# Patient Record
Sex: FEMALE | Race: WHITE
[De-identification: names, ages, dates, MRNs, and addresses within clinical notes are randomized per-mention and may not be internally consistent; named-entity substitution may affect disease eponyms.]

---

## 2017-04-27 ENCOUNTER — HOSPITAL ENCOUNTER (EMERGENCY)
Dept: HOSPITAL 47 - EC | Age: 37
Discharge: HOME | End: 2017-04-27
Payer: COMMERCIAL

## 2017-04-27 VITALS — DIASTOLIC BLOOD PRESSURE: 70 MMHG | RESPIRATION RATE: 16 BRPM | SYSTOLIC BLOOD PRESSURE: 123 MMHG | HEART RATE: 65 BPM

## 2017-04-27 VITALS — TEMPERATURE: 97.3 F

## 2017-04-27 DIAGNOSIS — Z3A.13: ICD-10-CM

## 2017-04-27 DIAGNOSIS — Z91.048: ICD-10-CM

## 2017-04-27 DIAGNOSIS — O99.89: ICD-10-CM

## 2017-04-27 DIAGNOSIS — O21.9: ICD-10-CM

## 2017-04-27 DIAGNOSIS — O23.41: Primary | ICD-10-CM

## 2017-04-27 DIAGNOSIS — Z79.899: ICD-10-CM

## 2017-04-27 DIAGNOSIS — R10.30: ICD-10-CM

## 2017-04-27 LAB
ANION GAP SERPL CALC-SCNC: 6 MMOL/L
BASOPHILS # BLD AUTO: 0 K/UL (ref 0–0.2)
BASOPHILS NFR BLD AUTO: 0 %
BUN SERPL-SCNC: 7 MG/DL (ref 7–17)
CALCIUM SPEC-MCNC: 9.1 MG/DL (ref 8.4–10.2)
CH: 30.7
CHCM: 33.9
CHLORIDE SERPL-SCNC: 105 MMOL/L (ref 98–107)
CO2 SERPL-SCNC: 24 MMOL/L (ref 22–30)
EOSINOPHIL # BLD AUTO: 0.4 K/UL (ref 0–0.7)
EOSINOPHIL NFR BLD AUTO: 3 %
ERYTHROCYTE [DISTWIDTH] IN BLOOD BY AUTOMATED COUNT: 4.13 M/UL (ref 3.8–5.4)
ERYTHROCYTE [DISTWIDTH] IN BLOOD: 12.9 % (ref 11.5–15.5)
GLUCOSE SERPL-MCNC: 88 MG/DL (ref 74–99)
HCG SERPL-MCNC: (no result) MIU/ML
HCT VFR BLD AUTO: 37.5 % (ref 34–46)
HDW: 2.38
HGB BLD-MCNC: 12.7 GM/DL (ref 11.4–16)
LUC NFR BLD AUTO: 1 %
LYMPHOCYTES # SPEC AUTO: 2 K/UL (ref 1–4.8)
LYMPHOCYTES NFR SPEC AUTO: 16 %
MCH RBC QN AUTO: 30.7 PG (ref 25–35)
MCHC RBC AUTO-ENTMCNC: 33.8 G/DL (ref 31–37)
MCV RBC AUTO: 90.8 FL (ref 80–100)
MONOCYTES # BLD AUTO: 0.5 K/UL (ref 0–1)
MONOCYTES NFR BLD AUTO: 4 %
NEUTROPHILS # BLD AUTO: 9.9 K/UL (ref 1.3–7.7)
NEUTROPHILS NFR BLD AUTO: 76 %
NON-AFRICAN AMERICAN GFR(MDRD): >60
PARTICLE COUNT: 8251
PH UR: 5.5 [PH] (ref 5–8)
POTASSIUM SERPL-SCNC: 4 MMOL/L (ref 3.5–5.1)
RBC UR QL: 2 /HPF (ref 0–5)
SODIUM SERPL-SCNC: 135 MMOL/L (ref 137–145)
SP GR UR: 1.02 (ref 1–1.03)
SQUAMOUS UR QL AUTO: 14 /HPF (ref 0–4)
UA BILLING (MACRO VS. MICRO): (no result)
UROBILINOGEN UR QL STRIP: <2 MG/DL (ref ?–2)
WBC # BLD AUTO: 0.13 10*3/UL
WBC # BLD AUTO: 13 K/UL (ref 3.8–10.6)
WBC #/AREA URNS HPF: 32 /HPF (ref 0–5)
WBC (PEROX): 12.8

## 2017-04-27 PROCEDURE — 87591 N.GONORRHOEAE DNA AMP PROB: CPT

## 2017-04-27 PROCEDURE — 87808 TRICHOMONAS ASSAY W/OPTIC: CPT

## 2017-04-27 PROCEDURE — 36415 COLL VENOUS BLD VENIPUNCTURE: CPT

## 2017-04-27 PROCEDURE — 76802 OB US < 14 WKS ADDL FETUS: CPT

## 2017-04-27 PROCEDURE — 87086 URINE CULTURE/COLONY COUNT: CPT

## 2017-04-27 PROCEDURE — 99284 EMERGENCY DEPT VISIT MOD MDM: CPT

## 2017-04-27 PROCEDURE — 87205 SMEAR GRAM STAIN: CPT

## 2017-04-27 PROCEDURE — 87491 CHLMYD TRACH DNA AMP PROBE: CPT

## 2017-04-27 PROCEDURE — 84702 CHORIONIC GONADOTROPIN TEST: CPT

## 2017-04-27 PROCEDURE — 85025 COMPLETE CBC W/AUTO DIFF WBC: CPT

## 2017-04-27 PROCEDURE — 87070 CULTURE OTHR SPECIMN AEROBIC: CPT

## 2017-04-27 PROCEDURE — 80048 BASIC METABOLIC PNL TOTAL CA: CPT

## 2017-04-27 PROCEDURE — 76801 OB US < 14 WKS SINGLE FETUS: CPT

## 2017-04-27 PROCEDURE — 81001 URINALYSIS AUTO W/SCOPE: CPT

## 2017-04-27 NOTE — US
EXAMINATION TYPE: US OB <= 14 wk twins

 

DATE OF EXAM: 4/27/2017 8:33 PM

 

COMPARISON: NONE

 

CLINICAL HISTORY: Pain.

 

EXAM PERFORMED:  Transabdominal (TA)

 

EXAM MEASUREMENTS:

 

GESTATIONAL AGE / DATING

 

Physician Established: (13 weeks/5 days)

** EDC:  10/28/2017

Dates by LMP:  (13 weeks/5 days)

** EDC: 10/28/2017

Dates by First Scan:  No previous 

Dates by Current Scan for Baby A: (13 weeks/1 days)

** EDC: 11/01/2017

Dates by Current Scan for Baby B: (13 weeks/1 days)

** EDC: 11/01/2017

 

MATERNAL ANATOMY 

 

Uterus: 13.1 x 9.4 x 10.3 cm

Right Ovary: 3.3 x 1.9 x 2.3 cm

Left Ovary: 2.5 x 1.7 x 2.1 cm

Post CDS / Adnexa: wnl

Presence of free fluid: No

Presence of corpus luteal cyst: No

Presence of subchorionic bleed: No

Presence of two separate gestational sacs:  Yes

 

GESTATION / FETAL SURVEY

   

** TWIN A 

CRL: 6.88 cm (13wks/1days)

Heart Rate: 159 bpm

Rhythm:  Normal

IUP:  Viable IUP

 

 

** TWIN B

CRL: 6.96 cm (wks/days)

Heart Rate: 163 bpm

Rhythm:  Normal

IUP:  Viable IUP

 

 

Date of LMP: 01/21/2017

Beta HcG (if available):  Not available 

 

Viable twin IUP, both with an JOSE LUIS of 11/01/2017 on this exam. 

 

 

IMPRESSION:

 

1. Twin pregnancy, both within EDC of 11/1/2017.

2. Twin A cardiac activity 159 bpm, Twin B cardiac activity 163 bpm.

## 2017-04-27 NOTE — ED
Abdominal Pain HPI





- General


Chief Complaint: Abdominal Pain


Stated Complaint: Abdominal pain (13 weeks preg)


Source: patient


Mode of arrival: ambulatory


Limitations: no limitations





- History of Present Illness


Initial Comments: 


Patient's a 36-year-old  at 13 weeks with twin gestation presenting with 

lower abdominal cramping that started this morning.  Patient has not tried 

anything for the pain.  Patient denies vaginal bleeding or vaginal discharge.  

Patient follows with Dr. Kristina Santos at [facility].  Patient told her 

OB/GYN that she was not able to keep anything down as she has vomited 10-15 

times. Patient denies sick contacts, suspicious food or travel.  OB/GYN 

instructed to try and push the fluids for which she has been unable to do with 

increased abdominal cramping.  She denies fever/chills, chest pain, stress 

breath, nausea, vomiting, diarrhea, dysuria.


Patient states she was on Clomid earlier this year in January but has not been 

on since.  Patient had outpatient OB/GYN follow-up last week with an IUP 

confirmed on ultrasound.





- Related Data


 Home Medications











 Medication  Instructions  Recorded  Confirmed


 


Loratadine [Claritin] 10 mg PO DAILY 17


 


Pnv with Ca,No.72/Iron/FA 1 tab PO DAILY 17





[Prenatal Plus Tablet]   


 


diphenhydrAMINE [Benadryl] 25 mg PO HS PRN 17











 Allergies











Allergy/AdvReac Type Severity Reaction Status Date / Time


 


bandaid adhesive Allergy  Rash/Hives Uncoded 17 18:58














Review of Systems


ROS Statement: 


Those systems with pertinent positive or pertinent negative responses have been 

documented in the HPI.


Constitutional: No fever and no chills. 


HENT: No congestion, no rhinorrhea and no sore throat. 


Eyes: No discharge and no redness. 


Respiratory: No cough and no shortness of breath. 


Cardiovascular: No chest pain and no palpitations. 


Gastrointestinal: +nausea, +vomiting, +abdominal pain and no diarrhea. 


Genitourinary: No dysuria and no hematuria. 


Musculoskeletal: No back pain and no arthralgias. 


Skin: No pallor and no rash. 


Neurological: No dizziness and No headaches.








ROS Other: All systems not noted in ROS Statement are negative.





Past Medical History


Past Medical History: Asthma


History of Any Multi-Drug Resistant Organisms: None Reported


Past Surgical History: No Surgical Hx Reported


Past Psychological History: No Psychological Hx Reported


Smoking Status: Never smoker


Past Alcohol Use History: None Reported


Past Drug Use History: None Reported





General Exam





- General Exam Comments


Initial Comments: 





Constitutional: Patient appears well-developed and well-nourished. Mild 

distress. 


Head: Normocephalic and atraumatic. 


Eyes: Conjunctivae and EOM are normal. Right eye exhibits no discharge. Left 

eye exhibits no discharge. No scleral icterus. 


Neck: Normal range of motion. Neck supple. 


Cardiovascular: Normal rate and regular rhythm. No murmur heard.


Pulmonary/Chest: Effort normal and breath sounds normal. No respiratory 

distress. No wheezes. 


Abdominal: Soft. No distension. There is no tenderness. There is no rebound and 

no guarding. 


Genitourinary: Completely with Crystal CAREY.  Normal external genitalia.  Vaginal 

canal without bleeding or lacerations.  Scant discharge on cervix.  Closed 

cervical os.  No bleeding.  Tenderness to left adnexal greater than right.  No 

cervical motion tenderness.


Musculoskeletal: Normal range of motion. No edema or tenderness. 


Neurological: Patient alert and oriented to person, place, and time. 


Skin: Skin is warm and dry. Not diaphoretic. 


Nursing notes and vitals reviewed.








Limitations: no limitations





Course


 Vital Signs











  17





  18:55


 


Temperature 97.3 F L


 


Pulse Rate 71


 


Respiratory 18





Rate 


 


Blood Pressure 133/83


 


O2 Sat by Pulse 99





Oximetry 














- Reevaluation(s)


Reevaluation #1: 





17 22:40


Patient updated results and feeling better.  Patient tolerating ginger ale.





Medical Decision Making





- Medical Decision Making





Patient's a 36-year-old female  at 13 weeks with twin gestation presenting 

with abdominal pain, nausea, vomiting.  Close cervical os without vaginal 

bleeding.  Workup including CBC, BMP, magnesium, pelvic ultrasound 

unremarkable.  Twin gestations present with heart rates within normal limits.  

UA contaminated but concerning for urinary tract infection and pregnancy; will 

treat with her with Keflex.  Updated patient's OB/GYN team who agrees with 

Pyridoxine/doxylaminefor nausea. Prior to discharge, patient was resting 

comfortably in bed. Course of stay improved. Denies pain. Discussed physical 

exam and diagnostic tests with patient. Questions answered and patient is 

agreeable to discharge with close follow up with Primary Care Physician. 

Instructed to return to Emergency Department if symptoms worsen.





- Lab Data


Result diagrams: 


 17 19:40





 17 19:40


 Lab Results











  17 Range/Units





  19:40 19:40 19:40 


 


WBC   13.0 H   (3.8-10.6)  k/uL


 


RBC   4.13   (3.80-5.40)  m/uL


 


Hgb   12.7   (11.4-16.0)  gm/dL


 


Hct   37.5   (34.0-46.0)  %


 


MCV   90.8   (80.0-100.0)  fL


 


MCH   30.7   (25.0-35.0)  pg


 


MCHC   33.8   (31.0-37.0)  g/dL


 


RDW   12.9   (11.5-15.5)  %


 


Plt Count   210   (150-450)  k/uL


 


Neutrophils %   76   %


 


Lymphocytes %   16   %


 


Monocytes %   4   %


 


Eosinophils %   3   %


 


Basophils %   0   %


 


Neutrophils #   9.9 H   (1.3-7.7)  k/uL


 


Lymphocytes #   2.0   (1.0-4.8)  k/uL


 


Monocytes #   0.5   (0-1.0)  k/uL


 


Eosinophils #   0.4   (0-0.7)  k/uL


 


Basophils #   0.0   (0-0.2)  k/uL


 


Sodium  135 L    (137-145)  mmol/L


 


Potassium  4.0    (3.5-5.1)  mmol/L


 


Chloride  105    ()  mmol/L


 


Carbon Dioxide  24    (22-30)  mmol/L


 


Anion Gap  6    mmol/L


 


BUN  7    (7-17)  mg/dL


 


Creatinine  0.53    (0.52-1.04)  mg/dL


 


Est GFR (MDRD) Af Amer  >60    (>60 ml/min/1.73 sqM)  


 


Est GFR (MDRD) Non-Af  >60    (>60 ml/min/1.73 sqM)  


 


Glucose  88    (74-99)  mg/dL


 


Calcium  9.1    (8.4-10.2)  mg/dL


 


HCG, Quant  82072.6    mIU/mL


 


Urine Color    Yellow  


 


Urine Appearance    Cloudy H  (Clear)  


 


Urine pH    5.5  (5.0-8.0)  


 


Ur Specific Gravity    1.020  (1.001-1.035)  


 


Urine Protein    Trace H  (Negative)  


 


Urine Glucose (UA)    Negative  (Negative)  


 


Urine Ketones    Trace H  (Negative)  


 


Urine Blood    Negative  (Negative)  


 


Urine Nitrite    Negative  (Negative)  


 


Urine Bilirubin    Negative  (Negative)  


 


Urine Urobilinogen    <2.0  (<2.0)  mg/dL


 


Ur Leukocyte Esterase    Large H  (Negative)  


 


Urine RBC    2  (0-5)  /hpf


 


Urine WBC    32 H  (0-5)  /hpf


 


Ur Squamous Epith Cells    14 H  (0-4)  /hpf


 


Amorphous Sediment    Occasional H  (None)  /hpf


 


Hyaline Casts    1  (0-2)  /lpf


 


Urine Mucus    Occasional H  (None)  /hpf


 


Trichomonas Ag (Rapid)     (Negative)  














  17 Range/Units





  20:05 


 


WBC   (3.8-10.6)  k/uL


 


RBC   (3.80-5.40)  m/uL


 


Hgb   (11.4-16.0)  gm/dL


 


Hct   (34.0-46.0)  %


 


MCV   (80.0-100.0)  fL


 


MCH   (25.0-35.0)  pg


 


MCHC   (31.0-37.0)  g/dL


 


RDW   (11.5-15.5)  %


 


Plt Count   (150-450)  k/uL


 


Neutrophils %   %


 


Lymphocytes %   %


 


Monocytes %   %


 


Eosinophils %   %


 


Basophils %   %


 


Neutrophils #   (1.3-7.7)  k/uL


 


Lymphocytes #   (1.0-4.8)  k/uL


 


Monocytes #   (0-1.0)  k/uL


 


Eosinophils #   (0-0.7)  k/uL


 


Basophils #   (0-0.2)  k/uL


 


Sodium   (137-145)  mmol/L


 


Potassium   (3.5-5.1)  mmol/L


 


Chloride   ()  mmol/L


 


Carbon Dioxide   (22-30)  mmol/L


 


Anion Gap   mmol/L


 


BUN   (7-17)  mg/dL


 


Creatinine   (0.52-1.04)  mg/dL


 


Est GFR (MDRD) Af Amer   (>60 ml/min/1.73 sqM)  


 


Est GFR (MDRD) Non-Af   (>60 ml/min/1.73 sqM)  


 


Glucose   (74-99)  mg/dL


 


Calcium   (8.4-10.2)  mg/dL


 


HCG, Quant   mIU/mL


 


Urine Color   


 


Urine Appearance   (Clear)  


 


Urine pH   (5.0-8.0)  


 


Ur Specific Gravity   (1.001-1.035)  


 


Urine Protein   (Negative)  


 


Urine Glucose (UA)   (Negative)  


 


Urine Ketones   (Negative)  


 


Urine Blood   (Negative)  


 


Urine Nitrite   (Negative)  


 


Urine Bilirubin   (Negative)  


 


Urine Urobilinogen   (<2.0)  mg/dL


 


Ur Leukocyte Esterase   (Negative)  


 


Urine RBC   (0-5)  /hpf


 


Urine WBC   (0-5)  /hpf


 


Ur Squamous Epith Cells   (0-4)  /hpf


 


Amorphous Sediment   (None)  /hpf


 


Hyaline Casts   (0-2)  /lpf


 


Urine Mucus   (None)  /hpf


 


Trichomonas Ag (Rapid)  Negative  (Negative)  














Disposition


Clinical Impression: 


 Abdominal pain, Pregnancy, Nausea/vomiting in pregnancy, UTI (urinary tract 

infection)





Disposition: HOME SELF-CARE


Condition: Good


Instructions:  Abdominal Pain in Pregnancy  (ED)


Additional Instructions: 


Follow-up with OB/GYN.


Referrals: 


None,Stated [Primary Care Provider] - 1-2 days

## 2019-06-10 ENCOUNTER — HOSPITAL ENCOUNTER (EMERGENCY)
Dept: HOSPITAL 47 - EC | Age: 39
Discharge: HOME | End: 2019-06-10
Payer: COMMERCIAL

## 2019-06-10 VITALS — HEART RATE: 72 BPM | TEMPERATURE: 97.8 F | DIASTOLIC BLOOD PRESSURE: 72 MMHG | SYSTOLIC BLOOD PRESSURE: 138 MMHG

## 2019-06-10 VITALS — RESPIRATION RATE: 18 BRPM

## 2019-06-10 DIAGNOSIS — R19.7: ICD-10-CM

## 2019-06-10 DIAGNOSIS — R11.2: Primary | ICD-10-CM

## 2019-06-10 DIAGNOSIS — Z91.048: ICD-10-CM

## 2019-06-10 DIAGNOSIS — R10.84: ICD-10-CM

## 2019-06-10 DIAGNOSIS — J45.909: ICD-10-CM

## 2019-06-10 LAB
ALBUMIN SERPL-MCNC: 4.6 G/DL (ref 3.5–5)
ALP SERPL-CCNC: 71 U/L (ref 38–126)
ALT SERPL-CCNC: 13 U/L (ref 9–52)
ANION GAP SERPL CALC-SCNC: 9 MMOL/L
AST SERPL-CCNC: 19 U/L (ref 14–36)
BASOPHILS # BLD AUTO: 0 K/UL (ref 0–0.2)
BASOPHILS NFR BLD AUTO: 0 %
BUN SERPL-SCNC: 13 MG/DL (ref 7–17)
CALCIUM SPEC-MCNC: 9.6 MG/DL (ref 8.4–10.2)
CHLORIDE SERPL-SCNC: 106 MMOL/L (ref 98–107)
CO2 SERPL-SCNC: 26 MMOL/L (ref 22–30)
EOSINOPHIL # BLD AUTO: 0.3 K/UL (ref 0–0.7)
EOSINOPHIL NFR BLD AUTO: 3 %
ERYTHROCYTE [DISTWIDTH] IN BLOOD BY AUTOMATED COUNT: 4.8 M/UL (ref 3.8–5.4)
ERYTHROCYTE [DISTWIDTH] IN BLOOD: 13.6 % (ref 11.5–15.5)
GLUCOSE SERPL-MCNC: 127 MG/DL (ref 74–99)
HCT VFR BLD AUTO: 42.6 % (ref 34–46)
HGB BLD-MCNC: 13.6 GM/DL (ref 11.4–16)
LIPASE SERPL-CCNC: 84 U/L (ref 23–300)
LYMPHOCYTES # SPEC AUTO: 1.5 K/UL (ref 1–4.8)
LYMPHOCYTES NFR SPEC AUTO: 16 %
MCH RBC QN AUTO: 28.3 PG (ref 25–35)
MCHC RBC AUTO-ENTMCNC: 31.9 G/DL (ref 31–37)
MCV RBC AUTO: 88.6 FL (ref 80–100)
MONOCYTES # BLD AUTO: 0.5 K/UL (ref 0–1)
MONOCYTES NFR BLD AUTO: 5 %
NEUTROPHILS # BLD AUTO: 7.1 K/UL (ref 1.3–7.7)
NEUTROPHILS NFR BLD AUTO: 74 %
PLATELET # BLD AUTO: 171 K/UL (ref 150–450)
POTASSIUM SERPL-SCNC: 4.4 MMOL/L (ref 3.5–5.1)
PROT SERPL-MCNC: 8.1 G/DL (ref 6.3–8.2)
SODIUM SERPL-SCNC: 141 MMOL/L (ref 137–145)
WBC # BLD AUTO: 9.5 K/UL (ref 3.8–10.6)

## 2019-06-10 PROCEDURE — 99283 EMERGENCY DEPT VISIT LOW MDM: CPT

## 2019-06-10 PROCEDURE — 85025 COMPLETE CBC W/AUTO DIFF WBC: CPT

## 2019-06-10 PROCEDURE — 96374 THER/PROPH/DIAG INJ IV PUSH: CPT

## 2019-06-10 PROCEDURE — 96361 HYDRATE IV INFUSION ADD-ON: CPT

## 2019-06-10 PROCEDURE — 36415 COLL VENOUS BLD VENIPUNCTURE: CPT

## 2019-06-10 PROCEDURE — 96375 TX/PRO/DX INJ NEW DRUG ADDON: CPT

## 2019-06-10 PROCEDURE — 80053 COMPREHEN METABOLIC PANEL: CPT

## 2019-06-10 PROCEDURE — 83690 ASSAY OF LIPASE: CPT

## 2019-06-10 NOTE — ED
Nausea/Vomiting/Diarrhea HPI





- General


Chief complaint: Nausea/Vomiting/Diarrhea


Stated complaint: Vomiting


Time Seen by Provider: 06/10/19 01:46


Source: patient


Mode of arrival: ambulatory


Limitations: physical limitation





- History of Present Illness


Initial comments: 


Dominga is a previously healthy 38-year-old female presents to the emergency 

department today for evaluation of 1 day of nausea vomiting and diarrhea.  

Patient reports she ate a salad on Saturday night and believes that it made her 

ill.  She reports that throughout the day on  she has had diarrhea, 

evening she developed nausea and reports from 9 PM until 1 AM she had vomiting 

approximately every 15 minutes.  At this point she reports she still having 

heaving and nausea but nothing left to vomit up so she came to ER because she 

just couldn't tolerate it any longer.


Has no history of any GI pathology.  No history of GI surgeries no history of 

bowel obstruction.  She reports she feels like she has been poisoning.





MD complaint: nausea, vomiting, diarrhea


-: hour(s)


Description of Vomiting: food contents, watery


Description of Diarrhea: water


Associated Abdominal Pain: Yes


Location: diffuse


Radiation: none


Severity: mild


Quality: cramping


Consistency: intermittent


Improves with: none


Worsens with: bowel movement, vomiting


Context: possible food poisoning


Associated Symptoms: denies other symptoms





- Related Data


                                Home Medications











 Medication  Instructions  Recorded  Confirmed


 


Pnv,Calcium 72/Iron/Folic Acid 1 tab PO DAILY 04/27/17 10/22/17





[Prenatal Plus Tablet]   


 


Albuterol Inhaler [Ventolin Hfa 1 - 2 puff INHALATION RT-QID PRN 10/22/17 

10/22/17





Inhaler]   











                                    Allergies











Allergy/AdvReac Type Severity Reaction Status Date / Time


 


bandaid adhesive Allergy  Rash/Hives Uncoded 06/10/19 01:44














Review of Systems


ROS Statement: 


Those systems with pertinent positive or pertinent negative responses have been 

documented in the HPI.





ROS Other: All systems not noted in ROS Statement are negative.





Past Medical History


Past Medical History: Asthma


History of Any Multi-Drug Resistant Organisms: None Reported


Past Surgical History:  Section


Past Psychological History: No Psychological Hx Reported


Smoking Status: Never smoker


Past Alcohol Use History: None Reported


Past Drug Use History: None Reported





General Exam





- General Exam Comments


Initial Comments: 


Physical Exam


GENERAL:


Patient is well-developed and well-nourished.  


Appears uncomfortable





HENT:


Normocephalic, Atraumatic. 





EYES:


PERRL, EOMI





PULMONARY:


Unlabored respirations.  No audible rales rhonchi or wheezing was noted.





CARDIOVASCULAR:


There is a regular rate and rhythm without any murmurs gallops or rubs.  





ABDOMEN:


Soft with normal bowel sounds. 


Mild discomfort with deep palpation diffusely





SKIN:


Skin is clear with no lesions or rashes and otherwise unremarkable.





: 


Deferred





NEUROLOGIC:


Patient is alert and oriented x3.  Moving all extremities spontaneously





MUSCULOSKELETAL:


Normal extremities with adequate strength and full range of motion.  No lower 

extremity swelling or edema.  No calf tenderness.  





PSYCHIATRIC:


Normal psychiatric evaluation





Limitations: physical limitation





Course


                                   Vital Signs











  06/10/19





  01:39


 


Temperature 98.4 F


 


Pulse Rate 59 L


 


Respiratory 18





Rate 


 


Blood Pressure 140/90


 


O2 Sat by Pulse 96





Oximetry 














Medical Decision Making





- Medical Decision Making


Patient was seen and evaluated history is obtained from the patient


Patient with nausea vomiting and diarrhea for day duration.  Patient reports at 

this point she continues to be nauseated have dry heaves since the ER today 

requesting medication for nausea and evaluation for dehydration.  Labs were 

unremarkable patient received Zofran, Pepcid and IV fluids.





Patient improved after meds and fluids patient agreeable to plan for discharge 

home with Zofran ODT.  All questions pertaining care were answered return 

parameters were discussed patient was discharged home in stable condition.








- Lab Data


Result diagrams: 


                                 06/10/19 02:00





                                 06/10/19 02:00


                                   Lab Results











  06/10/19 06/10/19 Range/Units





  02:00 02:00 


 


WBC  9.5   (3.8-10.6)  k/uL


 


RBC  4.80   (3.80-5.40)  m/uL


 


Hgb  13.6   (11.4-16.0)  gm/dL


 


Hct  42.6   (34.0-46.0)  %


 


MCV  88.6   (80.0-100.0)  fL


 


MCH  28.3   (25.0-35.0)  pg


 


MCHC  31.9   (31.0-37.0)  g/dL


 


RDW  13.6   (11.5-15.5)  %


 


Plt Count  171   (150-450)  k/uL


 


Neutrophils %  74   %


 


Lymphocytes %  16   %


 


Monocytes %  5   %


 


Eosinophils %  3   %


 


Basophils %  0   %


 


Neutrophils #  7.1   (1.3-7.7)  k/uL


 


Lymphocytes #  1.5   (1.0-4.8)  k/uL


 


Monocytes #  0.5   (0-1.0)  k/uL


 


Eosinophils #  0.3   (0-0.7)  k/uL


 


Basophils #  0.0   (0-0.2)  k/uL


 


Sodium   141  (137-145)  mmol/L


 


Potassium   4.4  (3.5-5.1)  mmol/L


 


Chloride   106  ()  mmol/L


 


Carbon Dioxide   26  (22-30)  mmol/L


 


Anion Gap   9  mmol/L


 


BUN   13  (7-17)  mg/dL


 


Creatinine   0.63  (0.52-1.04)  mg/dL


 


Est GFR (CKD-EPI)AfAm   >90  (>60 ml/min/1.73 sqM)  


 


Est GFR (CKD-EPI)NonAf   >90  (>60 ml/min/1.73 sqM)  


 


Glucose   127 H  (74-99)  mg/dL


 


Calcium   9.6  (8.4-10.2)  mg/dL


 


Total Bilirubin   0.7  (0.2-1.3)  mg/dL


 


AST   19  (14-36)  U/L


 


ALT   13  (9-52)  U/L


 


Alkaline Phosphatase   71  ()  U/L


 


Total Protein   8.1  (6.3-8.2)  g/dL


 


Albumin   4.6  (3.5-5.0)  g/dL


 


Lipase   84  ()  U/L














Disposition


Clinical Impression: 


 Nausea vomiting and diarrhea





Disposition: HOME SELF-CARE


Condition: Stable


Instructions (If sedation given, give patient instructions):  Acute Nausea and 

Vomiting (ED), Acute Diarrhea (ED)


Is patient prescribed a controlled substance at d/c from ED?: No


Referrals: 


None,Stated [Primary Care Provider] - 1-2 days

## 2019-06-11 ENCOUNTER — HOSPITAL ENCOUNTER (EMERGENCY)
Dept: HOSPITAL 47 - EC | Age: 39
Discharge: HOME | End: 2019-06-11
Payer: COMMERCIAL

## 2019-06-11 VITALS — RESPIRATION RATE: 18 BRPM

## 2019-06-11 VITALS — DIASTOLIC BLOOD PRESSURE: 69 MMHG | TEMPERATURE: 98.6 F | SYSTOLIC BLOOD PRESSURE: 108 MMHG | HEART RATE: 60 BPM

## 2019-06-11 DIAGNOSIS — K52.9: Primary | ICD-10-CM

## 2019-06-11 DIAGNOSIS — Z91.048: ICD-10-CM

## 2019-06-11 DIAGNOSIS — Z87.891: ICD-10-CM

## 2019-06-11 LAB
ALBUMIN SERPL-MCNC: 4.4 G/DL (ref 3.5–5)
ALP SERPL-CCNC: 68 U/L (ref 38–126)
ALT SERPL-CCNC: 22 U/L (ref 9–52)
AMYLASE SERPL-CCNC: 74 U/L (ref 30–110)
ANION GAP SERPL CALC-SCNC: 8 MMOL/L
AST SERPL-CCNC: 17 U/L (ref 14–36)
BASOPHILS # BLD AUTO: 0 K/UL (ref 0–0.2)
BASOPHILS NFR BLD AUTO: 0 %
BUN SERPL-SCNC: 9 MG/DL (ref 7–17)
CALCIUM SPEC-MCNC: 9.3 MG/DL (ref 8.4–10.2)
CHLORIDE SERPL-SCNC: 109 MMOL/L (ref 98–107)
CO2 SERPL-SCNC: 24 MMOL/L (ref 22–30)
EOSINOPHIL # BLD AUTO: 0.1 K/UL (ref 0–0.7)
EOSINOPHIL NFR BLD AUTO: 1 %
ERYTHROCYTE [DISTWIDTH] IN BLOOD BY AUTOMATED COUNT: 4.49 M/UL (ref 3.8–5.4)
ERYTHROCYTE [DISTWIDTH] IN BLOOD: 12.9 % (ref 11.5–15.5)
GLUCOSE SERPL-MCNC: 116 MG/DL (ref 74–99)
HCT VFR BLD AUTO: 39.6 % (ref 34–46)
HGB BLD-MCNC: 13.2 GM/DL (ref 11.4–16)
KETONES UR QL STRIP.AUTO: (no result)
LIPASE SERPL-CCNC: 98 U/L (ref 23–300)
LYMPHOCYTES # SPEC AUTO: 1.5 K/UL (ref 1–4.8)
LYMPHOCYTES NFR SPEC AUTO: 19 %
MCH RBC QN AUTO: 29.4 PG (ref 25–35)
MCHC RBC AUTO-ENTMCNC: 33.3 G/DL (ref 31–37)
MCV RBC AUTO: 88.3 FL (ref 80–100)
MONOCYTES # BLD AUTO: 0.3 K/UL (ref 0–1)
MONOCYTES NFR BLD AUTO: 4 %
NEUTROPHILS # BLD AUTO: 5.8 K/UL (ref 1.3–7.7)
NEUTROPHILS NFR BLD AUTO: 75 %
PH UR: 8.5 [PH] (ref 5–8)
PLATELET # BLD AUTO: 190 K/UL (ref 150–450)
POTASSIUM SERPL-SCNC: 4.1 MMOL/L (ref 3.5–5.1)
PROT SERPL-MCNC: 7.6 G/DL (ref 6.3–8.2)
PROT UR QL: (no result)
RBC UR QL: 2 /HPF (ref 0–5)
SODIUM SERPL-SCNC: 141 MMOL/L (ref 137–145)
SP GR UR: 1.02 (ref 1–1.03)
SQUAMOUS UR QL AUTO: 5 /HPF (ref 0–4)
UROBILINOGEN UR QL STRIP: <2 MG/DL (ref ?–2)
WBC # BLD AUTO: 7.8 K/UL (ref 3.8–10.6)
WBC #/AREA URNS HPF: 2 /HPF (ref 0–5)

## 2019-06-11 PROCEDURE — 36415 COLL VENOUS BLD VENIPUNCTURE: CPT

## 2019-06-11 PROCEDURE — 81001 URINALYSIS AUTO W/SCOPE: CPT

## 2019-06-11 PROCEDURE — 96374 THER/PROPH/DIAG INJ IV PUSH: CPT

## 2019-06-11 PROCEDURE — 74177 CT ABD & PELVIS W/CONTRAST: CPT

## 2019-06-11 PROCEDURE — 83690 ASSAY OF LIPASE: CPT

## 2019-06-11 PROCEDURE — 82150 ASSAY OF AMYLASE: CPT

## 2019-06-11 PROCEDURE — 96361 HYDRATE IV INFUSION ADD-ON: CPT

## 2019-06-11 PROCEDURE — 85025 COMPLETE CBC W/AUTO DIFF WBC: CPT

## 2019-06-11 PROCEDURE — 99285 EMERGENCY DEPT VISIT HI MDM: CPT

## 2019-06-11 PROCEDURE — 80053 COMPREHEN METABOLIC PANEL: CPT

## 2019-06-11 PROCEDURE — 81025 URINE PREGNANCY TEST: CPT

## 2019-06-11 PROCEDURE — 96375 TX/PRO/DX INJ NEW DRUG ADDON: CPT

## 2019-06-11 NOTE — ED
Nausea/Vomiting/Diarrhea HPI





- General


Chief complaint: Nausea/Vomiting/Diarrhea


Stated complaint: nausea, vomiting


Time Seen by Provider: 19 08:10


Source: patient, EMS, RN notes reviewed


Mode of arrival: EMS


Limitations: no limitations





- History of Present Illness


Initial comments: 





38-year-old female presents emergency Department with chief complaint of nausea 

vomiting.  Patient states that she started feeling sick on Saturday and then 

started vomiting on .  Patient seen in emergency department was given 

Zofran and fluids.  Patient states she did feel better went home started 

vomiting again last night.  Patient states she did have some which she thinks is

dark blood.  Patient has diffuse abdominal discomfort.  Denies any diarrhea.  

She states that she's had no stool output states that she's not been eating and 

drinking much.  Patient denies fever, chills, headache, dizziness or shortness 

of breath.





- Related Data


                                  Previous Rx's











 Medication  Instructions  Recorded


 


Famotidine [Pepcid] 20 mg PO BID #28 tablet 19


 


Ondansetron Odt [Zofran Odt] 4 mg PO Q8HR PRN #10 tab 19











                                    Allergies











Allergy/AdvReac Type Severity Reaction Status Date / Time


 


bandaid adhesive Allergy  Rash/Hives Uncoded 19 09:13














Review of Systems


ROS Statement: 


Those systems with pertinent positive or pertinent negative responses have been 

documented in the HPI.





ROS Other: All systems not noted in ROS Statement are negative.





Past Medical History


Past Medical History: Asthma


History of Any Multi-Drug Resistant Organisms: None Reported


Past Surgical History:  Section


Past Psychological History: No Psychological Hx Reported


Smoking Status: Former smoker


Past Alcohol Use History: None Reported


Past Drug Use History: Marijuana





General Exam


Limitations: no limitations


General appearance: alert, in no apparent distress


Head exam: Present: atraumatic, normocephalic, normal inspection


Eye exam: Present: normal appearance, PERRL, EOMI.  Absent: scleral icterus, 

conjunctival injection, periorbital swelling


Neck exam: Present: normal inspection, full ROM.  Absent: tenderness, 

meningismus, lymphadenopathy


Respiratory exam: Present: normal lung sounds bilaterally.  Absent: respiratory 

distress, wheezes, rales, rhonchi, stridor


Cardiovascular Exam: Present: regular rate, normal rhythm, normal heart sounds. 

Absent: systolic murmur, diastolic murmur, rubs, gallop, clicks


GI/Abdominal exam: Present: soft, tenderness (Mild/moderate diffuse), normal 

bowel sounds.  Absent: distended, guarding, rebound, rigid


Back exam: Absent: CVA tenderness (R), CVA tenderness (L)


Skin exam: Present: warm, dry, intact, normal color.  Absent: rash





Course


                                   Vital Signs











  19





  07:58 08:00 08:30


 


Temperature 98.4 F  


 


Pulse Rate 66  


 


Respiratory 20  





Rate   


 


Blood Pressure 139/80 135/92 139/80


 


O2 Sat by Pulse 100 100 100





Oximetry   














  19





  09:00 09:30 10:00


 


Temperature   


 


Pulse Rate  70 


 


Respiratory  18 





Rate   


 


Blood Pressure 124/74 118/65 138/85


 


O2 Sat by Pulse 94 L  100





Oximetry   














  19





  10:30 11:00


 


Temperature  


 


Pulse Rate  


 


Respiratory  





Rate  


 


Blood Pressure 147/91 131/85


 


O2 Sat by Pulse 100 





Oximetry  














Medical Decision Making





- Medical Decision Making





38-year-old female presents for nausea vomiting.  This is her second evaluation 

for similar complaint.  Patient feels improved after IV fluids, Benadryl, 

Reglan.  CT was obtained today secondary to diarrhea visit.  CT is unremarkable.

 Patient we discharged return parameters were discussed.





- Lab Data


Result diagrams: 


                                 19 08:25





                                 19 08:25


                                   Lab Results











  19 Range/Units





  08:25 08:25 09:25 


 


WBC   7.8   (3.8-10.6)  k/uL


 


RBC   4.49   (3.80-5.40)  m/uL


 


Hgb   13.2   (11.4-16.0)  gm/dL


 


Hct   39.6   (34.0-46.0)  %


 


MCV   88.3   (80.0-100.0)  fL


 


MCH   29.4   (25.0-35.0)  pg


 


MCHC   33.3   (31.0-37.0)  g/dL


 


RDW   12.9   (11.5-15.5)  %


 


Plt Count   190   (150-450)  k/uL


 


Neutrophils %   75   %


 


Lymphocytes %   19   %


 


Monocytes %   4   %


 


Eosinophils %   1   %


 


Basophils %   0   %


 


Neutrophils #   5.8   (1.3-7.7)  k/uL


 


Lymphocytes #   1.5   (1.0-4.8)  k/uL


 


Monocytes #   0.3   (0-1.0)  k/uL


 


Eosinophils #   0.1   (0-0.7)  k/uL


 


Basophils #   0.0   (0-0.2)  k/uL


 


Sodium  141    (137-145)  mmol/L


 


Potassium  4.1    (3.5-5.1)  mmol/L


 


Chloride  109 H    ()  mmol/L


 


Carbon Dioxide  24    (22-30)  mmol/L


 


Anion Gap  8    mmol/L


 


BUN  9    (7-17)  mg/dL


 


Creatinine  0.67    (0.52-1.04)  mg/dL


 


Est GFR (CKD-EPI)AfAm  >90    (>60 ml/min/1.73 sqM)  


 


Est GFR (CKD-EPI)NonAf  >90    (>60 ml/min/1.73 sqM)  


 


Glucose  116 H    (74-99)  mg/dL


 


Calcium  9.3    (8.4-10.2)  mg/dL


 


Total Bilirubin  0.8    (0.2-1.3)  mg/dL


 


AST  17    (14-36)  U/L


 


ALT  22    (9-52)  U/L


 


Alkaline Phosphatase  68    ()  U/L


 


Total Protein  7.6    (6.3-8.2)  g/dL


 


Albumin  4.4    (3.5-5.0)  g/dL


 


Amylase  74    ()  U/L


 


Lipase  98    ()  U/L


 


Urine Color    Yellow  


 


Urine Appearance    Cloudy H  (Clear)  


 


Urine pH    8.5 H  (5.0-8.0)  


 


Ur Specific Gravity    1.024  (1.001-1.035)  


 


Urine Protein    1+ H  (Negative)  


 


Urine Glucose (UA)    Negative  (Negative)  


 


Urine Ketones    1+ H  (Negative)  


 


Urine Blood    Negative  (Negative)  


 


Urine Nitrite    Negative  (Negative)  


 


Urine Bilirubin    Negative  (Negative)  


 


Urine Urobilinogen    <2.0  (<2.0)  mg/dL


 


Ur Leukocyte Esterase    Trace H  (Negative)  


 


Urine RBC    2  (0-5)  /hpf


 


Urine WBC    2  (0-5)  /hpf


 


Ur Squamous Epith Cells    5 H  (0-4)  /hpf


 


Urine Mucus    Few H  (None)  /hpf


 


Urine HCG, Qual     (Not Detectd)  














  19 Range/Units





  09:25 


 


WBC   (3.8-10.6)  k/uL


 


RBC   (3.80-5.40)  m/uL


 


Hgb   (11.4-16.0)  gm/dL


 


Hct   (34.0-46.0)  %


 


MCV   (80.0-100.0)  fL


 


MCH   (25.0-35.0)  pg


 


MCHC   (31.0-37.0)  g/dL


 


RDW   (11.5-15.5)  %


 


Plt Count   (150-450)  k/uL


 


Neutrophils %   %


 


Lymphocytes %   %


 


Monocytes %   %


 


Eosinophils %   %


 


Basophils %   %


 


Neutrophils #   (1.3-7.7)  k/uL


 


Lymphocytes #   (1.0-4.8)  k/uL


 


Monocytes #   (0-1.0)  k/uL


 


Eosinophils #   (0-0.7)  k/uL


 


Basophils #   (0-0.2)  k/uL


 


Sodium   (137-145)  mmol/L


 


Potassium   (3.5-5.1)  mmol/L


 


Chloride   ()  mmol/L


 


Carbon Dioxide   (22-30)  mmol/L


 


Anion Gap   mmol/L


 


BUN   (7-17)  mg/dL


 


Creatinine   (0.52-1.04)  mg/dL


 


Est GFR (CKD-EPI)AfAm   (>60 ml/min/1.73 sqM)  


 


Est GFR (CKD-EPI)NonAf   (>60 ml/min/1.73 sqM)  


 


Glucose   (74-99)  mg/dL


 


Calcium   (8.4-10.2)  mg/dL


 


Total Bilirubin   (0.2-1.3)  mg/dL


 


AST   (14-36)  U/L


 


ALT   (9-52)  U/L


 


Alkaline Phosphatase   ()  U/L


 


Total Protein   (6.3-8.2)  g/dL


 


Albumin   (3.5-5.0)  g/dL


 


Amylase   ()  U/L


 


Lipase   ()  U/L


 


Urine Color   


 


Urine Appearance   (Clear)  


 


Urine pH   (5.0-8.0)  


 


Ur Specific Gravity   (1.001-1.035)  


 


Urine Protein   (Negative)  


 


Urine Glucose (UA)   (Negative)  


 


Urine Ketones   (Negative)  


 


Urine Blood   (Negative)  


 


Urine Nitrite   (Negative)  


 


Urine Bilirubin   (Negative)  


 


Urine Urobilinogen   (<2.0)  mg/dL


 


Ur Leukocyte Esterase   (Negative)  


 


Urine RBC   (0-5)  /hpf


 


Urine WBC   (0-5)  /hpf


 


Ur Squamous Epith Cells   (0-4)  /hpf


 


Urine Mucus   (None)  /hpf


 


Urine HCG, Qual  Not Detected  (Not Detectd)  














Disposition


Clinical Impression: 


 Gastroenteritis





Disposition: HOME SELF-CARE


Condition: Stable


Instructions (If sedation given, give patient instructions):  Gastroenteritis 

(ED)


Additional Instructions: 


Please return to the Emergency Department if symptoms worsen or any other 

concerns.


Prescriptions: 


Famotidine [Pepcid] 20 mg PO BID #28 tablet


Ondansetron Odt [Zofran Odt] 4 mg PO Q8HR PRN #10 tab


 PRN Reason: Nausea


Is patient prescribed a controlled substance at d/c from ED?: No


Referrals: 


None,Stated [Primary Care Provider] - 1-2 days


Time of Disposition: 12:24

## 2019-06-11 NOTE — CT
EXAMINATION TYPE: CT abdomen pelvis w con

 

DATE OF EXAM: 6/11/2019

 

HISTORY: Abdominal pain with nausea and vomiting

 

CT DLP: 2430.1mGycm  Automated Exposure Control for Dose Reduction was Utilized.

 

CONTRAST: 

CT scan of the abdomen and pelvis is performed without oral but with IV Contrast, patient injected wi
th 100 mL of Isovue 300.

 

COMPARISON: CT abdomen pelvis October 22, 2017

 

FINDINGS:

 

LUNG BASES: No significant abnormality is appreciated.

 

LIVER/GB: No significant abnormality is appreciated.

 

PANCREAS: No significant abnormality is seen.

 

SPLEEN: Tiny splenule in anterior splenic hilum axial image 21 is redemonstrated.

 

ADRENALS: No significant abnormality is seen.

 

KIDNEYS: Symmetric cortical medullary uptake and excretion from both kidneys is seen without hydronep
hrosis identified bilaterally.

 

BOWEL: Evaluation bowel is suboptimal secondary to lack of enteric contrast. There is no suspicious s
mall or large bowel dilatation. Normal-appearing appendix is seen from cecum in the right lower quadr
ant. Mild wall thickening involving transverse and left colon is presumed product of poor distention 
extending into the proximal one half of the sigmoid colon. A mild colitis cannot be excluded. Mild wa
ll thickening also is noted at level of terminal ileum coronal image 32.

 

UTERUS/ADNEXA: Slightly retroverted uterus. Both ovaries are normal in size.

 

LYMPH NODES: No greater than 1cm abdominal or pelvic lymph nodes are appreciated.

 

OSSEOUS STRUCTURES: No significant abnormality is seen.

 

OTHER: No significant additional abnormality is seen.

 

IMPRESSION: No bowel obstruction. Cannot exclude mild enterocolitis but favor product of poor distent
ion related to lack of enteric contrast, correlate clinically.

## 2019-06-13 ENCOUNTER — HOSPITAL ENCOUNTER (OUTPATIENT)
Dept: HOSPITAL 47 - EC | Age: 39
Setting detail: OBSERVATION
LOS: 1 days | Discharge: HOME | End: 2019-06-14
Attending: HOSPITALIST | Admitting: HOSPITALIST
Payer: COMMERCIAL

## 2019-06-13 DIAGNOSIS — Z83.3: ICD-10-CM

## 2019-06-13 DIAGNOSIS — Z86.19: ICD-10-CM

## 2019-06-13 DIAGNOSIS — E87.2: ICD-10-CM

## 2019-06-13 DIAGNOSIS — E66.9: ICD-10-CM

## 2019-06-13 DIAGNOSIS — Z91.09: ICD-10-CM

## 2019-06-13 DIAGNOSIS — Z79.899: ICD-10-CM

## 2019-06-13 DIAGNOSIS — R11.2: Primary | ICD-10-CM

## 2019-06-13 DIAGNOSIS — E86.9: ICD-10-CM

## 2019-06-13 DIAGNOSIS — R10.13: ICD-10-CM

## 2019-06-13 DIAGNOSIS — Z83.2: ICD-10-CM

## 2019-06-13 DIAGNOSIS — J45.909: ICD-10-CM

## 2019-06-13 DIAGNOSIS — Z87.891: ICD-10-CM

## 2019-06-13 DIAGNOSIS — Z82.49: ICD-10-CM

## 2019-06-13 DIAGNOSIS — Z87.19: ICD-10-CM

## 2019-06-13 DIAGNOSIS — Z81.8: ICD-10-CM

## 2019-06-13 DIAGNOSIS — F12.90: ICD-10-CM

## 2019-06-13 DIAGNOSIS — Z82.0: ICD-10-CM

## 2019-06-13 DIAGNOSIS — R19.7: ICD-10-CM

## 2019-06-13 LAB
ALBUMIN SERPL-MCNC: 4.2 G/DL (ref 3.5–5)
ALP SERPL-CCNC: 61 U/L (ref 38–126)
ALT SERPL-CCNC: 17 U/L (ref 9–52)
AMYLASE SERPL-CCNC: 74 U/L (ref 30–110)
ANION GAP SERPL CALC-SCNC: 9 MMOL/L
AST SERPL-CCNC: 17 U/L (ref 14–36)
BASOPHILS # BLD AUTO: 0 K/UL (ref 0–0.2)
BASOPHILS NFR BLD AUTO: 0 %
BUN SERPL-SCNC: 10 MG/DL (ref 7–17)
CALCIUM SPEC-MCNC: 8.4 MG/DL (ref 8.4–10.2)
CHLORIDE SERPL-SCNC: 108 MMOL/L (ref 98–107)
CO2 SERPL-SCNC: 23 MMOL/L (ref 22–30)
EOSINOPHIL # BLD AUTO: 0.1 K/UL (ref 0–0.7)
EOSINOPHIL NFR BLD AUTO: 1 %
ERYTHROCYTE [DISTWIDTH] IN BLOOD BY AUTOMATED COUNT: 4.33 M/UL (ref 3.8–5.4)
ERYTHROCYTE [DISTWIDTH] IN BLOOD: 12.6 % (ref 11.5–15.5)
GLUCOSE SERPL-MCNC: 121 MG/DL (ref 74–99)
HCT VFR BLD AUTO: 38.1 % (ref 34–46)
HGB BLD-MCNC: 13 GM/DL (ref 11.4–16)
HYALINE CASTS UR QL AUTO: 1 /LPF (ref 0–2)
KETONES UR QL STRIP.AUTO: (no result)
LIPASE SERPL-CCNC: 100 U/L (ref 23–300)
LYMPHOCYTES # SPEC AUTO: 1.2 K/UL (ref 1–4.8)
LYMPHOCYTES NFR SPEC AUTO: 13 %
MCH RBC QN AUTO: 30.1 PG (ref 25–35)
MCHC RBC AUTO-ENTMCNC: 34.2 G/DL (ref 31–37)
MCV RBC AUTO: 88.1 FL (ref 80–100)
MONOCYTES # BLD AUTO: 0.4 K/UL (ref 0–1)
MONOCYTES NFR BLD AUTO: 5 %
NEUTROPHILS # BLD AUTO: 6.9 K/UL (ref 1.3–7.7)
NEUTROPHILS NFR BLD AUTO: 80 %
PH UR: 8.5 [PH] (ref 5–8)
PLATELET # BLD AUTO: 186 K/UL (ref 150–450)
POTASSIUM SERPL-SCNC: 3.6 MMOL/L (ref 3.5–5.1)
PROT SERPL-MCNC: 7 G/DL (ref 6.3–8.2)
PROT UR QL: (no result)
RBC UR QL: 2 /HPF (ref 0–5)
SODIUM SERPL-SCNC: 140 MMOL/L (ref 137–145)
SP GR UR: 1.02 (ref 1–1.03)
SQUAMOUS UR QL AUTO: 2 /HPF (ref 0–4)
UROBILINOGEN UR QL STRIP: <2 MG/DL (ref ?–2)
WBC # BLD AUTO: 8.6 K/UL (ref 3.8–10.6)
WBC #/AREA URNS HPF: 2 /HPF (ref 0–5)

## 2019-06-13 PROCEDURE — 99285 EMERGENCY DEPT VISIT HI MDM: CPT

## 2019-06-13 PROCEDURE — 83605 ASSAY OF LACTIC ACID: CPT

## 2019-06-13 PROCEDURE — 80053 COMPREHEN METABOLIC PANEL: CPT

## 2019-06-13 PROCEDURE — 81001 URINALYSIS AUTO W/SCOPE: CPT

## 2019-06-13 PROCEDURE — 96375 TX/PRO/DX INJ NEW DRUG ADDON: CPT

## 2019-06-13 PROCEDURE — 96361 HYDRATE IV INFUSION ADD-ON: CPT

## 2019-06-13 PROCEDURE — 96376 TX/PRO/DX INJ SAME DRUG ADON: CPT

## 2019-06-13 PROCEDURE — 80306 DRUG TEST PRSMV INSTRMNT: CPT

## 2019-06-13 PROCEDURE — 82150 ASSAY OF AMYLASE: CPT

## 2019-06-13 PROCEDURE — 87040 BLOOD CULTURE FOR BACTERIA: CPT

## 2019-06-13 PROCEDURE — 83690 ASSAY OF LIPASE: CPT

## 2019-06-13 PROCEDURE — 85025 COMPLETE CBC W/AUTO DIFF WBC: CPT

## 2019-06-13 PROCEDURE — 96374 THER/PROPH/DIAG INJ IV PUSH: CPT

## 2019-06-13 PROCEDURE — 74018 RADEX ABDOMEN 1 VIEW: CPT

## 2019-06-13 PROCEDURE — 36415 COLL VENOUS BLD VENIPUNCTURE: CPT

## 2019-06-13 RX ADMIN — CEFAZOLIN SCH MLS/HR: 330 INJECTION, POWDER, FOR SOLUTION INTRAMUSCULAR; INTRAVENOUS at 11:55

## 2019-06-13 RX ADMIN — METOCLOPRAMIDE PRN MG: 5 INJECTION, SOLUTION INTRAMUSCULAR; INTRAVENOUS at 19:38

## 2019-06-13 RX ADMIN — CEFAZOLIN SCH MLS/HR: 330 INJECTION, POWDER, FOR SOLUTION INTRAMUSCULAR; INTRAVENOUS at 19:37

## 2019-06-13 RX ADMIN — METOCLOPRAMIDE PRN MG: 5 INJECTION, SOLUTION INTRAMUSCULAR; INTRAVENOUS at 12:06

## 2019-06-13 RX ADMIN — PANTOPRAZOLE SODIUM SCH MG: 40 INJECTION, POWDER, FOR SOLUTION INTRAVENOUS at 19:37

## 2019-06-13 NOTE — P.HPIM
History of Present Illness


38-year-old doesn't female came in with department of epigastric abdominal 

burning sensation and nausea vomiting patient had multiple episodes of nausea 

vomiting a couple episodes of diarrhea today and one episode of diarrhea yesterd

ay.  Patient denied any sick contacts denied any body aches.  Patient is on 

Protonix but also started on Toradol Toradol will be discontinued and patient 

was started on Tylenol for pain.  Patient was diagnosed with gastritis and was 

given Zofran and Pepcid was discharged from came back again.  Patient does use 

marijuana patient did use marijuana last night this is marijuana helps her 

nausea.  Patient apparently had some coffee-ground emesis.  Because of this 

gastric body was consulted.  Patient was started on proton pump inhibitor will 

discontinue Toradol will monitor her here.








Review of Systems








REVIEW OF SYSTEMS: 


CONSTITUTIONAL: No fever, no malaise, no fatigue. 


HEENT: No recent visual problems or hearing problems. Denied any sore throat. 


CARDIOVASCULAR: No chest pain, orthopnea, PND, no palpitations, no syncope. 


PULMONARY: No shortness of breath, no cough, no hemoptysis. 


GASTROINTESTINAL: as mentioned in HPI


NEUROLOGICAL: No headaches, no weakness, no numbness. 


HEMATOLOGICAL: Denies any bleeding or petechiae. 


GENITOURINARY: Denies any burning micturition, frequency, or urgency. 


MUSCULOSKELETAL/RHEUMATOLOGICAL: Denies any joint pain, swelling, or any muscle 

pain. 


ENDOCRINE: Denies any polyuria or polydipsia. 





The rest of the 14-point review of systems is negative.











Past Medical History


Past Medical History: Asthma


History of Any Multi-Drug Resistant Organisms: None Reported


Past Surgical History:  Section


Past Anesthesia/Blood Transfusion Reactions: No Reported Reaction


Past Psychological History: No Psychological Hx Reported


Smoking Status: Former smoker


Past Alcohol Use History: None Reported


Past Drug Use History: Marijuana





- Past Family History


  ** Father


Family Medical History: Diabetes Mellitus





  ** Mother


Family Medical History: Dementia, Musculoskeletal Disorder


Additional Family Medical History / Comment(s): passed away from complications 

of CDiff and MS





Medications and Allergies


                                Home Medications











 Medication  Instructions  Recorded  Confirmed  Type


 


Famotidine [Pepcid] 20 mg PO BID #28 tablet 19 Rx


 


Ondansetron Odt [Zofran Odt] 4 mg PO Q8HR PRN #10 tab 19 Rx








                                    Allergies











Allergy/AdvReac Type Severity Reaction Status Date / Time


 


bandaid adhesive Allergy  Rash/Hives Uncoded 19 07:55














Physical Exam


Vitals: 


                                   Vital Signs











  Temp Pulse Pulse Resp BP BP Pulse Ox


 


 19 13:01  99.0 F   53 L  16   104/67  100


 


 19 11:32  99.3 F   54 L  16   132/87  100


 


 19 11:12  98.8 F  57 L   18  121/65   98


 


 19 07:51  98.4 F  60   18  136/88   100








                                Intake and Output











 19





 22:59 06:59 14:59


 


Intake Total   400


 


Balance   400


 


Intake:   


 


  IV   400


 


    Sodium Chloride 0.9% 1,   400





    000 ml @ 100 mls/hr IV .   





    Q10H [facility] Rx#:[id number]   


 


Other:   


 


  Weight   113.398 kg

















PHYSICAL EXAMINATION: 





GENERAL: The patient is alert and oriented x3, not in any acute distress. Well 

developed, well nourished. 


HEENT: Pupils are round and equally reacting to light. EOMI. No scleral icterus.

No conjunctival pallor. Normocephalic, atraumatic. No pharyngeal erythema. No 

thyromegaly. 


CARDIOVASCULAR: S1 and S2 present. No murmurs, rubs, or gallops. 


PULMONARY: Chest is clear to auscultation, no wheezing or crackles. 


ABDOMEN: Soft,minimal tenderness in the epigastric area bowel sounds are good


MUSCULOSKELETAL: No joint swelling or deformity.


EXTREMITIES: No cyanosis, clubbing, or pedal edema. 


NEUROLOGICAL: Gross neurological examination did not reveal any focal deficits. 


SKIN: No rashes. 

















Results


CBC & Chem 7: 


                                 19 08:09





                                 19 08:09


Labs: 


                  Abnormal Lab Results - Last 24 Hours (Table)











  19 Range/Units





  08:09 08:09 08:21 


 


Chloride  108 H    ()  mmol/L


 


Glucose  121 H    (74-99)  mg/dL


 


Plasma Lactic Acid Maurilio   2.4 H*   (0.7-2.0)  mmol/L


 


Urine pH    8.5 H  (5.0-8.0)  


 


Urine Protein    1+ H  (Negative)  


 


Urine Ketones    2+ H  (Negative)  


 


Urine Mucus    Few H  (None)  /hpf


 


U Marijuana (THC) Screen     (NotDetected)  














  19 Range/Units





  08:21 


 


Chloride   ()  mmol/L


 


Glucose   (74-99)  mg/dL


 


Plasma Lactic Acid Maurilio   (0.7-2.0)  mmol/L


 


Urine pH   (5.0-8.0)  


 


Urine Protein   (Negative)  


 


Urine Ketones   (Negative)  


 


Urine Mucus   (None)  /hpf


 


U Marijuana (THC) Screen  Detected H  (NotDetected)  














Thrombosis Risk Factor Assmnt





- Choose All That Apply


Each Factor Represents 1 point: Obesity (BMI >25)


Each Risk Factor Represents 3 Points: Family history of DVT/PE


Thrombosis Risk Factor Assessment Total Risk Factor Score: 4


Thrombosis Risk Factor Assessment Level: Moderate Risk





Assessment and Plan


Plan: 


-nausea vomiting with possible [place] coffee-ground emesis can be from retching or

peptic ulcer disease, continue Protonix, continue with IV fluids.


-Lactic acidosis secondary to intravascular depletion tissue hypoperfusion 

expected to improve with IV fluids which will be continued


-marijuana use: Counseling was provided regarding this.


DVT prophylaxis early ambulation

## 2019-06-13 NOTE — XR
EXAMINATION TYPE: XR KUB

 

DATE OF EXAM: 6/13/2019 8:47 AM

 

CLINICAL HISTORY:  Vomiting blood and pain.

 

TECHNIQUE:  Two Upright KUB images of the abdomen are obtained.

 

COMPARISON: CT abdomen and pelvis 2 days ago.

 

FINDINGS: Some paucity of bowel gas is redemonstrated. Gas is seen in nondistended small and large melanie
wel loops scattered throughout the abdomen and pelvis. There is no visceromegaly, pneumoperitoneum, o
r abnormal calcification appreciated. The lung bases are clear. Spurring at pubic symphysis is redemo
nstrated.

 

IMPRESSION: 

 

Overall nonspecific but felt to reflect persistent nonobstructive bowel gas pattern. Source: Patient [ X] advanced dementia , non-verbal, lethargic  Family [ ]     other [X ] electronic chart, RN     Diet : NPO    Patient seen for nutrition follow-up. Patient remains NPO. Patient w/dysphagia in setting of advance dementia. Noted GOC discussion. Plan for home hospice noted.     Current Weight: Weight (kg): 57 (01-23 @ 21:52)      Pertinent Medications: dextrose 5% + sodium chloride 0.45%.  dextrose 5%.    Pertinent Labs:   01-26 Phos 3.2 mg/dL 01-23 Alb 1.9 g/dL<L>    +2 left hand edema noted.     Skin: intact.     Estimated Needs:   [X ] no change since previous assessment  [ ] recalculated:     Previous Nutrition Diagnosis:     [ ] Inadequate Energy Intake [ ]Inadequate Oral Intake [ ] Excessive Energy Intake     [ ] Underweight [ ] Increased Nutrient Needs [ ] Overweight/Obesity     [ ] Altered GI Function [ ] Unintended Weight Loss [ ] Food & Nutrition Related Knowledge Deficit [X ] Malnutrition, Severe     Nutrition Diagnosis is [X ] ongoing  [ ] resolved [ ] not applicable        Additional Recommendations:  1. Continue with nutrition GOC as per patient and family wishes.

## 2019-06-13 NOTE — ED
Nausea/Vomiting/Diarrhea HPI





- General


Chief complaint: Nausea/Vomiting/Diarrhea


Stated complaint: Vomiting blood


Time Seen by Provider: 19 07:50


Source: patient, EMS, RN notes reviewed, old records reviewed


Mode of arrival: EMS


Limitations: no limitations





- History of Present Illness


Initial comments: 





Patient is a 38-year-old female presents emergency department today for 

evaluation with complaints of nausea and vomiting.  Patient has been to the 

emergency department twice this week for similar complaints.  Time she is 

diagnosed with gastritis, she states that she has not been able to tolerate her 

Zofran and Pepcid.  Patient has had no fevers or chills.  She reports that she's

not been able to eat much over the past 3 days.  Patient states that she's had 

no diarrhea.  Today she reports coffee ground emesis.





- Related Data


                                  Previous Rx's











 Medication  Instructions  Recorded


 


Famotidine [Pepcid] 20 mg PO BID #28 tablet 19


 


Ondansetron Odt [Zofran Odt] 4 mg PO Q8HR PRN #10 tab 19











                                    Allergies











Allergy/AdvReac Type Severity Reaction Status Date / Time


 


bandaid adhesive Allergy  Rash/Hives Uncoded 19 07:55














Review of Systems


ROS Statement: 


Those systems with pertinent positive or pertinent negative responses have been 

documented in the HPI.





ROS Other: All systems not noted in ROS Statement are negative.





Past Medical History


Past Medical History: Asthma


History of Any Multi-Drug Resistant Organisms: None Reported


Past Surgical History:  Section


Past Psychological History: No Psychological Hx Reported


Smoking Status: Former smoker


Past Alcohol Use History: None Reported


Past Drug Use History: Marijuana





General Exam





- General Exam Comments


Initial Comments: 





38-year-old female.  Alert and oriented 3.  No distress.


Limitations: no limitations


General appearance: alert, in no apparent distress


Head exam: Present: atraumatic, normocephalic, normal inspection


Eye exam: Present: normal appearance, PERRL, EOMI.  Absent: scleral icterus, 

conjunctival injection, periorbital swelling


ENT exam: Present: normal exam, mucous membranes moist


Neck exam: Present: normal inspection.  Absent: tenderness, meningismus, 

lymphadenopathy


Respiratory exam: Present: normal lung sounds bilaterally.  Absent: respiratory 

distress, wheezes, rales, rhonchi, stridor


Cardiovascular Exam: Present: regular rate, normal rhythm, normal heart sounds. 

Absent: systolic murmur, diastolic murmur, rubs, gallop, clicks


GI/Abdominal exam: Present: soft, normal bowel sounds.  Absent: distended, 

tenderness, guarding, rebound, rigid


Extremities exam: Present: normal inspection, full ROM, normal capillary refill.

 Absent: tenderness, pedal edema, joint swelling, calf tenderness


Back exam: Present: normal inspection


Neurological exam: Present: alert, oriented X3, CN II-XII intact


Psychiatric exam: Present: normal affect, normal mood





Course


                                   Vital Signs











  19





  07:51


 


Temperature 98.4 F


 


Pulse Rate 60


 


Respiratory 18





Rate 


 


Blood Pressure 136/88


 


O2 Sat by Pulse 100





Oximetry 














Medical Decision Making





- Medical Decision Making





Patient instructed on present the third time emergency department for nausea and

vomiting.  This Patient is clearly dehydrated.  Vomiting bile and ER.  Patient's

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  She claims epigastric down..  Concern for 

possibility of gastritis or GERD.  Unable to tolerate by mouth medications.  

Patient was treated IV fluids, Zofran and Pepcid.  Patient will be admitted to 

observation for dehydration junk consult to GI.





- Lab Data


Result diagrams: 


                                 19 08:09





                                 19 08:09


                                   Lab Results











  19 Range/Units





  08:09 08:09 08:09 


 


WBC   8.6   (3.8-10.6)  k/uL


 


RBC   4.33   (3.80-5.40)  m/uL


 


Hgb   13.0   (11.4-16.0)  gm/dL


 


Hct   38.1   (34.0-46.0)  %


 


MCV   88.1   (80.0-100.0)  fL


 


MCH   30.1   (25.0-35.0)  pg


 


MCHC   34.2   (31.0-37.0)  g/dL


 


RDW   12.6   (11.5-15.5)  %


 


Plt Count   186   (150-450)  k/uL


 


Neutrophils %   80   %


 


Lymphocytes %   13   %


 


Monocytes %   5   %


 


Eosinophils %   1   %


 


Basophils %   0   %


 


Neutrophils #   6.9   (1.3-7.7)  k/uL


 


Lymphocytes #   1.2   (1.0-4.8)  k/uL


 


Monocytes #   0.4   (0-1.0)  k/uL


 


Eosinophils #   0.1   (0-0.7)  k/uL


 


Basophils #   0.0   (0-0.2)  k/uL


 


Sodium  140    (137-145)  mmol/L


 


Potassium  3.6    (3.5-5.1)  mmol/L


 


Chloride  108 H    ()  mmol/L


 


Carbon Dioxide  23    (22-30)  mmol/L


 


Anion Gap  9    mmol/L


 


BUN  10    (7-17)  mg/dL


 


Creatinine  0.60    (0.52-1.04)  mg/dL


 


Est GFR (CKD-EPI)AfAm  >90    (>60 ml/min/1.73 sqM)  


 


Est GFR (CKD-EPI)NonAf  >90    (>60 ml/min/1.73 sqM)  


 


Glucose  121 H    (74-99)  mg/dL


 


Plasma Lactic Acid Maurilio    2.4 H*  (0.7-2.0)  mmol/L


 


Calcium  8.4    (8.4-10.2)  mg/dL


 


Total Bilirubin  0.9    (0.2-1.3)  mg/dL


 


AST  17    (14-36)  U/L


 


ALT  17    (9-52)  U/L


 


Alkaline Phosphatase  61    ()  U/L


 


Total Protein  7.0    (6.3-8.2)  g/dL


 


Albumin  4.2    (3.5-5.0)  g/dL


 


Amylase  74    ()  U/L


 


Lipase  100    ()  U/L


 


Urine Color     


 


Urine Appearance     (Clear)  


 


Urine pH     (5.0-8.0)  


 


Ur Specific Gravity     (1.001-1.035)  


 


Urine Protein     (Negative)  


 


Urine Glucose (UA)     (Negative)  


 


Urine Ketones     (Negative)  


 


Urine Blood     (Negative)  


 


Urine Nitrite     (Negative)  


 


Urine Bilirubin     (Negative)  


 


Urine Urobilinogen     (<2.0)  mg/dL


 


Ur Leukocyte Esterase     (Negative)  


 


Urine RBC     (0-5)  /hpf


 


Urine WBC     (0-5)  /hpf


 


Ur Squamous Epith Cells     (0-4)  /hpf


 


Hyaline Casts     (0-2)  /lpf


 


Urine Mucus     (None)  /hpf














  19 Range/Units





  08:21 


 


WBC   (3.8-10.6)  k/uL


 


RBC   (3.80-5.40)  m/uL


 


Hgb   (11.4-16.0)  gm/dL


 


Hct   (34.0-46.0)  %


 


MCV   (80.0-100.0)  fL


 


MCH   (25.0-35.0)  pg


 


MCHC   (31.0-37.0)  g/dL


 


RDW   (11.5-15.5)  %


 


Plt Count   (150-450)  k/uL


 


Neutrophils %   %


 


Lymphocytes %   %


 


Monocytes %   %


 


Eosinophils %   %


 


Basophils %   %


 


Neutrophils #   (1.3-7.7)  k/uL


 


Lymphocytes #   (1.0-4.8)  k/uL


 


Monocytes #   (0-1.0)  k/uL


 


Eosinophils #   (0-0.7)  k/uL


 


Basophils #   (0-0.2)  k/uL


 


Sodium   (137-145)  mmol/L


 


Potassium   (3.5-5.1)  mmol/L


 


Chloride   ()  mmol/L


 


Carbon Dioxide   (22-30)  mmol/L


 


Anion Gap   mmol/L


 


BUN   (7-17)  mg/dL


 


Creatinine   (0.52-1.04)  mg/dL


 


Est GFR (CKD-EPI)AfAm   (>60 ml/min/1.73 sqM)  


 


Est GFR (CKD-EPI)NonAf   (>60 ml/min/1.73 sqM)  


 


Glucose   (74-99)  mg/dL


 


Plasma Lactic Acid Maurilio   (0.7-2.0)  mmol/L


 


Calcium   (8.4-10.2)  mg/dL


 


Total Bilirubin   (0.2-1.3)  mg/dL


 


AST   (14-36)  U/L


 


ALT   (9-52)  U/L


 


Alkaline Phosphatase   ()  U/L


 


Total Protein   (6.3-8.2)  g/dL


 


Albumin   (3.5-5.0)  g/dL


 


Amylase   ()  U/L


 


Lipase   ()  U/L


 


Urine Color  Yellow  


 


Urine Appearance  Clear  (Clear)  


 


Urine pH  8.5 H  (5.0-8.0)  


 


Ur Specific Gravity  1.025  (1.001-1.035)  


 


Urine Protein  1+ H  (Negative)  


 


Urine Glucose (UA)  Negative  (Negative)  


 


Urine Ketones  2+ H  (Negative)  


 


Urine Blood  Negative  (Negative)  


 


Urine Nitrite  Negative  (Negative)  


 


Urine Bilirubin  Negative  (Negative)  


 


Urine Urobilinogen  <2.0  (<2.0)  mg/dL


 


Ur Leukocyte Esterase  Negative  (Negative)  


 


Urine RBC  2  (0-5)  /hpf


 


Urine WBC  2  (0-5)  /hpf


 


Ur Squamous Epith Cells  2  (0-4)  /hpf


 


Hyaline Casts  1  (0-2)  /lpf


 


Urine Mucus  Few H  (None)  /hpf














Disposition


Clinical Impression: 


 Nausea vomiting and diarrhea, Gastroenteritis





Disposition: ADMITTED AS IP TO THIS HOSP


Condition: Good


Is patient prescribed a controlled substance at d/c from ED?: No


Referrals: 


None,Stated [Primary Care Provider] - 1-2 days


Time of Disposition: 10:51

## 2019-06-14 VITALS — HEART RATE: 59 BPM | TEMPERATURE: 99 F | DIASTOLIC BLOOD PRESSURE: 78 MMHG | SYSTOLIC BLOOD PRESSURE: 121 MMHG

## 2019-06-14 VITALS — RESPIRATION RATE: 20 BRPM

## 2019-06-14 RX ADMIN — CEFAZOLIN SCH MLS/HR: 330 INJECTION, POWDER, FOR SOLUTION INTRAMUSCULAR; INTRAVENOUS at 08:31

## 2019-06-14 RX ADMIN — PANTOPRAZOLE SODIUM SCH MG: 40 INJECTION, POWDER, FOR SOLUTION INTRAVENOUS at 08:30

## 2019-06-14 NOTE — P.DS
Providers


Date of admission: 


06/13/19 10:57





Attending physician: 


Johanny Salinas





Primary care physician: 


Stated None





Hospital Course: 


38-year-old female was admitted with nausea vomiting and epigastric abdominal 

burning sensation status epilepticus of disease improved symptoms with the 

Protonix patient will be discharged on Prilosec.  Patient was counseled to quit 

marijuana.  Patient will follow with PCP in about 3-7 days











PHYSICAL EXAMINATION: 





GENERAL: The patient is alert and oriented x3, not in any acute distress. Well 

developed, well nourished. 


HEENT: Pupils are round and equally reacting to light. EOMI. No scleral icterus.

No conjunctival pallor. Normocephalic, atraumatic. No pharyngeal erythema. No 

thyromegaly. 


CARDIOVASCULAR: S1 and S2 present. No murmurs, rubs, or gallops. 


PULMONARY: Chest is clear to auscultation, no wheezing or crackles. 


ABDOMEN: Soft, nontender, nondistended, normoactive bowel sounds. No palpable 

organomegaly. 


MUSCULOSKELETAL: No joint swelling or deformity.


EXTREMITIES: No cyanosis, clubbing, or pedal edema. 


NEUROLOGICAL: Gross neurological examination did not reveal any focal deficits. 


SKIN: No rashes. 








The rest of the other medical problems and hospitalization course please refer 

to my HPI from yesterday





Patient Condition at Discharge: Good





Plan - Discharge Summary


Discharge Rx Participant: No


New Discharge Prescriptions: 


New


   Omeprazole [PriLOSEC] 40 mg PO AC-BID #30 capsule.


   Metoclopramide HCl [Reglan] 5 mg PO TID PRN #30 tablet


     PRN Reason: Nausea And Vomiting





No Action


   Famotidine [Pepcid] 20 mg PO BID #28 tablet


   Ondansetron Odt [Zofran Odt] 4 mg PO Q8HR PRN #10 tab


     PRN Reason: Nausea


Discharge Medication List





Famotidine [Pepcid] 20 mg PO BID #28 tablet 06/11/19 [Rx]


Ondansetron Odt [Zofran Odt] 4 mg PO Q8HR PRN #10 tab 06/11/19 [Rx]


Metoclopramide HCl [Reglan] 5 mg PO TID PRN #30 tablet 06/14/19 [Rx]


Omeprazole [PriLOSEC] 40 mg PO AC-BID #30 capsule. 06/14/19 [Rx]








Follow up Appointment(s)/Referral(s): 


Maurice Plascencia MD [REFERRING] - 06/21/19 2:10 pm


Patient Instructions/Handouts:  Dehydration (DC), Acute Nausea and Vomiting (DC)


Discharge Disposition: HOME SELF-CARE

## 2020-02-17 ENCOUNTER — HOSPITAL ENCOUNTER (EMERGENCY)
Dept: HOSPITAL 47 - EC | Age: 40
Discharge: HOME | End: 2020-02-17
Payer: COMMERCIAL

## 2020-02-17 VITALS — HEART RATE: 82 BPM | SYSTOLIC BLOOD PRESSURE: 106 MMHG | DIASTOLIC BLOOD PRESSURE: 59 MMHG

## 2020-02-17 VITALS — TEMPERATURE: 97.9 F

## 2020-02-17 VITALS — RESPIRATION RATE: 18 BRPM

## 2020-02-17 DIAGNOSIS — Z87.42: ICD-10-CM

## 2020-02-17 DIAGNOSIS — Z79.51: ICD-10-CM

## 2020-02-17 DIAGNOSIS — Z87.891: ICD-10-CM

## 2020-02-17 DIAGNOSIS — Z98.890: ICD-10-CM

## 2020-02-17 DIAGNOSIS — Z91.048: ICD-10-CM

## 2020-02-17 DIAGNOSIS — R10.2: Primary | ICD-10-CM

## 2020-02-17 DIAGNOSIS — J45.909: ICD-10-CM

## 2020-02-17 LAB
ALBUMIN SERPL-MCNC: 4.1 G/DL (ref 3.5–5)
ALP SERPL-CCNC: 57 U/L (ref 38–126)
ALT SERPL-CCNC: 11 U/L (ref 4–34)
AMYLASE SERPL-CCNC: 61 U/L (ref 30–110)
ANION GAP SERPL CALC-SCNC: 8 MMOL/L
AST SERPL-CCNC: 19 U/L (ref 14–36)
BASOPHILS # BLD AUTO: 0.1 K/UL (ref 0–0.2)
BASOPHILS NFR BLD AUTO: 0 %
BUN SERPL-SCNC: 13 MG/DL (ref 7–17)
CALCIUM SPEC-MCNC: 9 MG/DL (ref 8.4–10.2)
CHLORIDE SERPL-SCNC: 104 MMOL/L (ref 98–107)
CO2 SERPL-SCNC: 26 MMOL/L (ref 22–30)
EOSINOPHIL # BLD AUTO: 0.5 K/UL (ref 0–0.7)
EOSINOPHIL NFR BLD AUTO: 4 %
ERYTHROCYTE [DISTWIDTH] IN BLOOD BY AUTOMATED COUNT: 4.4 M/UL (ref 3.8–5.4)
ERYTHROCYTE [DISTWIDTH] IN BLOOD: 12.8 % (ref 11.5–15.5)
GLUCOSE SERPL-MCNC: 102 MG/DL (ref 74–99)
HCT VFR BLD AUTO: 39.8 % (ref 34–46)
HGB BLD-MCNC: 12.9 GM/DL (ref 11.4–16)
LYMPHOCYTES # SPEC AUTO: 2.1 K/UL (ref 1–4.8)
LYMPHOCYTES NFR SPEC AUTO: 20 %
MCH RBC QN AUTO: 29.2 PG (ref 25–35)
MCHC RBC AUTO-ENTMCNC: 32.3 G/DL (ref 31–37)
MCV RBC AUTO: 90.4 FL (ref 80–100)
MONOCYTES # BLD AUTO: 0.5 K/UL (ref 0–1)
MONOCYTES NFR BLD AUTO: 5 %
NEUTROPHILS # BLD AUTO: 7.4 K/UL (ref 1.3–7.7)
NEUTROPHILS NFR BLD AUTO: 69 %
PH UR: 5 [PH] (ref 5–8)
PLATELET # BLD AUTO: 180 K/UL (ref 150–450)
POTASSIUM SERPL-SCNC: 3.9 MMOL/L (ref 3.5–5.1)
PROT SERPL-MCNC: 7.3 G/DL (ref 6.3–8.2)
RBC UR QL: 2 /HPF (ref 0–5)
SODIUM SERPL-SCNC: 138 MMOL/L (ref 137–145)
SP GR UR: 1.02 (ref 1–1.03)
SQUAMOUS UR QL AUTO: 4 /HPF (ref 0–4)
UROBILINOGEN UR QL STRIP: <2 MG/DL (ref ?–2)
WBC # BLD AUTO: 10.7 K/UL (ref 3.8–10.6)
WBC # UR AUTO: 2 /HPF (ref 0–5)

## 2020-02-17 PROCEDURE — 81001 URINALYSIS AUTO W/SCOPE: CPT

## 2020-02-17 PROCEDURE — 96375 TX/PRO/DX INJ NEW DRUG ADDON: CPT

## 2020-02-17 PROCEDURE — 80053 COMPREHEN METABOLIC PANEL: CPT

## 2020-02-17 PROCEDURE — 85025 COMPLETE CBC W/AUTO DIFF WBC: CPT

## 2020-02-17 PROCEDURE — 93976 VASCULAR STUDY: CPT

## 2020-02-17 PROCEDURE — 99284 EMERGENCY DEPT VISIT MOD MDM: CPT

## 2020-02-17 PROCEDURE — 82150 ASSAY OF AMYLASE: CPT

## 2020-02-17 PROCEDURE — 36415 COLL VENOUS BLD VENIPUNCTURE: CPT

## 2020-02-17 PROCEDURE — 96361 HYDRATE IV INFUSION ADD-ON: CPT

## 2020-02-17 PROCEDURE — 96374 THER/PROPH/DIAG INJ IV PUSH: CPT

## 2020-02-17 PROCEDURE — 81025 URINE PREGNANCY TEST: CPT

## 2020-02-17 PROCEDURE — 74177 CT ABD & PELVIS W/CONTRAST: CPT

## 2020-02-17 PROCEDURE — 83690 ASSAY OF LIPASE: CPT

## 2020-02-17 PROCEDURE — 76830 TRANSVAGINAL US NON-OB: CPT

## 2020-02-17 NOTE — CT
EXAMINATION TYPE: CT abdomen pelvis w con

 

DATE OF EXAM: 2/17/2020

 

COMPARISON: 6/11/2019

 

HISTORY: abd pain for 1 week

 

CT DLP: 2200 mGycm

Automated exposure control for dose reduction was used.

 

CONTRAST: 

Performed with IV Contrast, patient injected with 100 mL of Isovue 300.

 

Multiple axial sections were obtained from the diaphragm to the floor the pelvis with intravenous con
trast.

 

Lung bases are clear. There is no pleural effusion. Heart size is normal.

 

Liver spleen pancreas stomach gallbladder appear normal. Bile ducts are not dilated.

 

There is no adrenal mass. Kidneys show satisfactory contrast opacification. There is no hydronephrosi
s. Bladder distends smoothly. There is no free fluid in the pelvis.

 

There is no sign of thickened appendix. There is no inguinal hernia.

 

There is no mesenteric edema. There is no ascites or free air. There is no sign of a bowel obstructio
n. Uterus is retroverted. Lumbar vertebra have fairly normal alignment. There is bilateral L4 spondyl
olysis and the 4 mm L4-5 spondylolisthesis. There is no compression fracture. Bony pelvis is intact.

 

IMPRESSION:

No sign of acute abdomen and pelvis. L4 spondylolysis. No adverse change compared to old exam.

## 2020-02-17 NOTE — ED
Abdominal Pain HPI





- General


Chief Complaint: Abdominal Pain


Stated Complaint: Abd Pain


Time Seen by Provider: 20 19:58


Source: patient


Mode of arrival: ambulatory


Limitations: no limitations





- History of Present Illness


Initial Comments: 


39-year-old female patient presents to the emergency department today for 

evaluation of left lower quadrant abdominal pain.  Patient states that she has 

had some cramping to the region over the last week.  Patient states today the 

pain worsened, states it feels like someone is stabbing her in the region with a

knife.  She denies any hematuria, dysuria, urinary frequency, urinary urgency.  

Denies any abnormal vaginal bleeding, discharge, or itching.  Patient states she

has had similar symptoms in the past when she had an ovarian cyst.  Patient 

states she is attempting to get pregnant and has not taken a pregnancy test for 

the last month.  States her last period was on 2020.  Sates that was 

shorter and lighter for her.  Patient denies taking any medications for her 

symptoms.  She denies fever or chills.  Denies any nausea or vomiting.Patient 

denies any recent rash, shortness breath, chest pain, constipation, back pain, 

numbness, tingling, dizziness, weakness, headache, visual changes, or any other 

complaints.








- Related Data


                                Home Medications











 Medication  Instructions  Recorded  Confirmed


 


Albuterol Sulfate [Ventolin HFA] 1 - 2 puff INHALATION Q6H PRN 20


 


Fluticasone Nasal Spray [Flonase 2 spr EA NOSTRIL DAILY PRN 20





Nasal Spray]   











                                    Allergies











Allergy/AdvReac Type Severity Reaction Status Date / Time


 


bandaid adhesive Allergy  Rash/Hives Uncoded 20 21:31














Review of Systems


ROS Statement: 


Those systems with pertinent positive or pertinent negative responses have been 

documented in the HPI.





ROS Other: All systems not noted in ROS Statement are negative.





Past Medical History


Past Medical History: Asthma


History of Any Multi-Drug Resistant Organisms: None Reported


Past Surgical History:  Section


Past Anesthesia/Blood Transfusion Reactions: No Reported Reaction


Past Psychological History: No Psychological Hx Reported


Smoking Status: Former smoker


Past Alcohol Use History: Rare


Past Drug Use History: Marijuana





- Past Family History


  ** Father


Family Medical History: Diabetes Mellitus





  ** Mother


Family Medical History: Dementia, Musculoskeletal Disorder


Additional Family Medical History / Comment(s): passed away from complications 

of CDiff and MS





General Exam


Limitations: no limitations


General appearance: alert, in no apparent distress, other (This is a well-

developed, well-nourished adult female patient in no acute distress.  Vital 

signs upon presentation are temperature 98.5F, pulse 68, respirations 16, blood

pressure 139/88, pulse ox 100% on room air.)


Eye exam: Present: normal appearance, PERRL, EOMI.  Absent: scleral icterus, 

conjunctival injection, periorbital swelling


ENT exam: Present: normal exam, normal oropharynx, mucous membranes moist


Respiratory exam: Present: normal lung sounds bilaterally.  Absent: respiratory 

distress, wheezes, rales, rhonchi, stridor


Cardiovascular Exam: Present: regular rate, normal rhythm, normal heart sounds. 

Absent: systolic murmur, diastolic murmur, rubs, gallop, clicks


GI/Abdominal exam: Present: soft, tenderness (Left lower quadrant tenderness), 

normal bowel sounds.  Absent: distended, guarding, rebound, rigid


Neurological exam: Present: alert, oriented X3, CN II-XII intact


Psychiatric exam: Present: normal affect, normal mood


Skin exam: Present: warm, dry, intact, normal color.  Absent: rash





Course


                                   Vital Signs











  20





  19:48 20:35 21:35


 


Temperature 98.5 F  97.9 F


 


Pulse Rate 68 81 83


 


Respiratory 16 18 17





Rate   


 


Blood Pressure 139/88 122/77 121/71


 


O2 Sat by Pulse 100 97 97





Oximetry   














  20





  22:34


 


Temperature 


 


Pulse Rate 78


 


Respiratory 18





Rate 


 


Blood Pressure 111/73


 


O2 Sat by Pulse 98





Oximetry 














Medical Decision Making





- Medical Decision Making


39-year-old female patient presents to the emergency department today for 

evaluation of left lower quadrant abdominal pain.  Physical examination did 

reveal tenderness over the left lower quadrant.  Patient denied abnormal vaginal

bleeding, discharge, or itching.  She has no concerns for sexually transmitted 

infections.  Labs reviewed and are unremarkable.  HCG negative.  No sign of 

urinary tract infection.  Ultrasound of the pelvis was obtained and showed no 

abnormalities.  Computed tomography scan of the abdomen and pelvis was obtained 

and showed no acute abdomen.  I did discuss findings and results with the 

patient.  We'll discharge follow-up with her primary care physician and her 

gynecologist for further evaluation as soon as possible.  Return parameters were

discussed in detail patient verbalizes understanding and agrees with this plan.








- Lab Data


Result diagrams: 


                                 20 20:33





                                 20 20:33


                                   Lab Results











  20 Range/Units





  20:24 20:24 20:33 


 


WBC     (3.8-10.6)  k/uL


 


RBC     (3.80-5.40)  m/uL


 


Hgb     (11.4-16.0)  gm/dL


 


Hct     (34.0-46.0)  %


 


MCV     (80.0-100.0)  fL


 


MCH     (25.0-35.0)  pg


 


MCHC     (31.0-37.0)  g/dL


 


RDW     (11.5-15.5)  %


 


Plt Count     (150-450)  k/uL


 


Neutrophils %     %


 


Lymphocytes %     %


 


Monocytes %     %


 


Eosinophils %     %


 


Basophils %     %


 


Neutrophils #     (1.3-7.7)  k/uL


 


Lymphocytes #     (1.0-4.8)  k/uL


 


Monocytes #     (0-1.0)  k/uL


 


Eosinophils #     (0-0.7)  k/uL


 


Basophils #     (0-0.2)  k/uL


 


Sodium    138  (137-145)  mmol/L


 


Potassium    3.9  (3.5-5.1)  mmol/L


 


Chloride    104  ()  mmol/L


 


Carbon Dioxide    26  (22-30)  mmol/L


 


Anion Gap    8  mmol/L


 


BUN    13  (7-17)  mg/dL


 


Creatinine    0.66  (0.52-1.04)  mg/dL


 


Est GFR (CKD-EPI)AfAm    >90  (>60 ml/min/1.73 sqM)  


 


Est GFR (CKD-EPI)NonAf    >90  (>60 ml/min/1.73 sqM)  


 


Glucose    102 H  (74-99)  mg/dL


 


Calcium    9.0  (8.4-10.2)  mg/dL


 


Total Bilirubin    0.4  (0.2-1.3)  mg/dL


 


AST    19  (14-36)  U/L


 


ALT    11  (4-34)  U/L


 


Alkaline Phosphatase    57  ()  U/L


 


Total Protein    7.3  (6.3-8.2)  g/dL


 


Albumin    4.1  (3.5-5.0)  g/dL


 


Amylase    61  ()  U/L


 


Lipase    164  ()  U/L


 


Urine Color  Yellow    


 


Urine Appearance  Turbid H    (Clear)  


 


Urine pH  5.0    (5.0-8.0)  


 


Ur Specific Gravity  1.025    (1.001-1.035)  


 


Urine Protein  Negative    (Negative)  


 


Urine Glucose (UA)  Negative    (Negative)  


 


Urine Ketones  Negative    (Negative)  


 


Urine Blood  Negative    (Negative)  


 


Urine Nitrite  Negative    (Negative)  


 


Urine Bilirubin  Negative    (Negative)  


 


Urine Urobilinogen  <2.0    (<2.0)  mg/dL


 


Ur Leukocyte Esterase  Negative    (Negative)  


 


Urine RBC  2    (0-5)  /hpf


 


Urine WBC  2    (0-5)  /hpf


 


Ur Squamous Epith Cells  4    (0-4)  /hpf


 


Urine Bacteria  Rare H    (None)  /hpf


 


Urine Mucus  Rare H    (None)  /hpf


 


Urine HCG, Qual   Not Detected   (Not Detectd)  














  20 Range/Units





  20:33 


 


WBC  10.7 H  (3.8-10.6)  k/uL


 


RBC  4.40  (3.80-5.40)  m/uL


 


Hgb  12.9  (11.4-16.0)  gm/dL


 


Hct  39.8  (34.0-46.0)  %


 


MCV  90.4  (80.0-100.0)  fL


 


MCH  29.2  (25.0-35.0)  pg


 


MCHC  32.3  (31.0-37.0)  g/dL


 


RDW  12.8  (11.5-15.5)  %


 


Plt Count  180  (150-450)  k/uL


 


Neutrophils %  69  %


 


Lymphocytes %  20  %


 


Monocytes %  5  %


 


Eosinophils %  4  %


 


Basophils %  0  %


 


Neutrophils #  7.4  (1.3-7.7)  k/uL


 


Lymphocytes #  2.1  (1.0-4.8)  k/uL


 


Monocytes #  0.5  (0-1.0)  k/uL


 


Eosinophils #  0.5  (0-0.7)  k/uL


 


Basophils #  0.1  (0-0.2)  k/uL


 


Sodium   (137-145)  mmol/L


 


Potassium   (3.5-5.1)  mmol/L


 


Chloride   ()  mmol/L


 


Carbon Dioxide   (22-30)  mmol/L


 


Anion Gap   mmol/L


 


BUN   (7-17)  mg/dL


 


Creatinine   (0.52-1.04)  mg/dL


 


Est GFR (CKD-EPI)AfAm   (>60 ml/min/1.73 sqM)  


 


Est GFR (CKD-EPI)NonAf   (>60 ml/min/1.73 sqM)  


 


Glucose   (74-99)  mg/dL


 


Calcium   (8.4-10.2)  mg/dL


 


Total Bilirubin   (0.2-1.3)  mg/dL


 


AST   (14-36)  U/L


 


ALT   (4-34)  U/L


 


Alkaline Phosphatase   ()  U/L


 


Total Protein   (6.3-8.2)  g/dL


 


Albumin   (3.5-5.0)  g/dL


 


Amylase   ()  U/L


 


Lipase   ()  U/L


 


Urine Color   


 


Urine Appearance   (Clear)  


 


Urine pH   (5.0-8.0)  


 


Ur Specific Gravity   (1.001-1.035)  


 


Urine Protein   (Negative)  


 


Urine Glucose (UA)   (Negative)  


 


Urine Ketones   (Negative)  


 


Urine Blood   (Negative)  


 


Urine Nitrite   (Negative)  


 


Urine Bilirubin   (Negative)  


 


Urine Urobilinogen   (<2.0)  mg/dL


 


Ur Leukocyte Esterase   (Negative)  


 


Urine RBC   (0-5)  /hpf


 


Urine WBC   (0-5)  /hpf


 


Ur Squamous Epith Cells   (0-4)  /hpf


 


Urine Bacteria   (None)  /hpf


 


Urine Mucus   (None)  /hpf


 


Urine HCG, Qual   (Not Detectd)  














- Radiology Data


Radiology results: report reviewed, image reviewed


Ultrasound of the pelvis is obtained.  Report is reviewed in its entirety.  

Impression by Dr. Andrew shows negative exam.  No adnexal mass or free fluid.





CT abdomen and pelvis with contrast was obtained.  Report was reviewed in its 

entirety.  Impression by Dr. Andrew shows no sign of acute abdomen and pelvis

read L4 spondylosis no adverse change compared to old exam.





Disposition


Clinical Impression: 


 Pelvic pain





Disposition: HOME SELF-CARE


Condition: Good


Instructions (If sedation given, give patient instructions):  Pelvic Pain in 

Women (ED)


Additional Instructions: 


Follow-up with your gynecologist for further evaluation as soon as possible.  

Return to the emergency department immediately for any new, worsening, or 

concerning symptoms.


Is patient prescribed a controlled substance at d/c from ED?: No


Referrals: 


Josh Delong MD [STAFF PHYSICIAN] - 1-2 days


Time of Disposition: 22:43

## 2020-02-17 NOTE — US
EXAMINATION TYPE: US transvaginal

 

DATE OF EXAM: 2020

 

COMPARISON: US, CT

 

CLINICAL HISTORY: Left pelvic pain; hx of cysts. Left-sided pelvic pain x 1 week. Hx of ovarian cysts
, HPV. Hx Twin pregnancy,  2017.

 

TECHNIQUE:  Transvaginal (TV).  

 

Date of LMP:  2020

 

EXAM MEASUREMENTS:

 

Uterus:  7.9 x 7.0 x 4.2 cm

Endometrial Stripe: 1.29 cm

Right Ovary:  2.6 x 1.7 x 1.6 cm

Left Ovary:  Not visualized 

 

 

 

1. Uterus:  Retroverted. Appears to be wnl.

2. Endometrium:  Measures 1.29 cm.

3. Right Ovary:  Follicles seen.

4. Left Ovary:  Not visualized.

**Spectral, color and waveform doppler imaging shows arterial and venous flow within the right ovary.
 Left ovary is not visualized at this time.

5. Bilateral Adnexa:  Appear to be wnl.

6. Posterior cul-de-sac:  Appears to be wnl.

 

 

 

IMPRESSION: Negative exam. No adnexal mass or free fluid.